# Patient Record
Sex: MALE | Race: WHITE
[De-identification: names, ages, dates, MRNs, and addresses within clinical notes are randomized per-mention and may not be internally consistent; named-entity substitution may affect disease eponyms.]

---

## 2019-07-05 ENCOUNTER — HOSPITAL ENCOUNTER (OUTPATIENT)
Dept: HOSPITAL 47 - EC | Age: 64
Setting detail: OBSERVATION
LOS: 1 days | Discharge: NURSING, REHAB OR LONG-TERM CARE | End: 2019-07-06
Attending: HOSPITALIST | Admitting: HOSPITALIST
Payer: MEDICARE

## 2019-07-05 VITALS — BODY MASS INDEX: 34.5 KG/M2

## 2019-07-05 DIAGNOSIS — G89.4: ICD-10-CM

## 2019-07-05 DIAGNOSIS — M47.9: ICD-10-CM

## 2019-07-05 DIAGNOSIS — M06.9: ICD-10-CM

## 2019-07-05 DIAGNOSIS — E78.5: ICD-10-CM

## 2019-07-05 DIAGNOSIS — T40.601A: Primary | ICD-10-CM

## 2019-07-05 DIAGNOSIS — Z79.4: ICD-10-CM

## 2019-07-05 DIAGNOSIS — Z97.8: ICD-10-CM

## 2019-07-05 DIAGNOSIS — M96.1: ICD-10-CM

## 2019-07-05 DIAGNOSIS — G40.909: ICD-10-CM

## 2019-07-05 DIAGNOSIS — Z79.1: ICD-10-CM

## 2019-07-05 DIAGNOSIS — I95.2: ICD-10-CM

## 2019-07-05 DIAGNOSIS — Z79.82: ICD-10-CM

## 2019-07-05 DIAGNOSIS — I50.9: ICD-10-CM

## 2019-07-05 DIAGNOSIS — Z79.899: ICD-10-CM

## 2019-07-05 DIAGNOSIS — E66.01: ICD-10-CM

## 2019-07-05 DIAGNOSIS — Z82.0: ICD-10-CM

## 2019-07-05 DIAGNOSIS — E11.42: ICD-10-CM

## 2019-07-05 DIAGNOSIS — I11.0: ICD-10-CM

## 2019-07-05 DIAGNOSIS — Z79.891: ICD-10-CM

## 2019-07-05 DIAGNOSIS — R26.2: ICD-10-CM

## 2019-07-05 DIAGNOSIS — Z87.891: ICD-10-CM

## 2019-07-05 DIAGNOSIS — Z86.19: ICD-10-CM

## 2019-07-05 LAB — GLUCOSE BLD-MCNC: 201 MG/DL (ref 75–99)

## 2019-07-05 PROCEDURE — 96372 THER/PROPH/DIAG INJ SC/IM: CPT

## 2019-07-05 PROCEDURE — 80048 BASIC METABOLIC PNL TOTAL CA: CPT

## 2019-07-05 PROCEDURE — 99285 EMERGENCY DEPT VISIT HI MDM: CPT

## 2019-07-05 PROCEDURE — 84132 ASSAY OF SERUM POTASSIUM: CPT

## 2019-07-05 RX ADMIN — ACETAMINOPHEN PRN MG: 500 TABLET ORAL at 23:11

## 2019-07-05 RX ADMIN — NYSTATIN SCH APPLIC: 100000 OINTMENT TOPICAL at 21:56

## 2019-07-05 RX ADMIN — INSULIN ASPART SCH UNIT: 100 INJECTION, SOLUTION INTRAVENOUS; SUBCUTANEOUS at 21:32

## 2019-07-05 NOTE — ED
Recheck HPI





- General


Stated Complaint: OD


Time Seen by Provider: 07/05/19 19:02


Source: RN notes reviewed, old records reviewed





- History of Present Illness


Initial Comments: 





This is a 63-year-old male the ER for evaluation.  This patient presents today 

for evaluation regards to pain.  Patient has generalized body pain.  Patient 

presents as a transfer patient today.  Patient was transferred to us from 

[facility] for evaluation regarding low blood pressure secondary to 

opiates.  They put him on Narcan drip and sent to ER secondary low blood 

pressures


MD Complaint: other (Evaluation of the tenderness in blood pressure secondary to

opiates)


-: unknown


Returns Today for: other (low BP)


Symptoms Since Prior Visit: no new symptoms


Associated Symptoms: none





- Related Data


                                Home Medications











 Medication  Instructions  Recorded  Confirmed


 


Acetaminophen [Tylenol] 1,000 mg PO BID 02/23/16 02/25/16


 


Gabapentin [Neurontin] 1,800 mg PO BID 02/23/16 02/25/16


 


Insulin Detemir [Levemir Flextouch] 25 units SQ HS 02/23/16 02/25/16


 


Insulin Lispro [humaLOG] 0 units SQ TID-W/MEALS PRN 02/23/16 02/25/16


 


Metoprolol Tartrate [Lopressor] 25 mg PO BID 02/23/16 02/25/16


 


Ondansetron [Zofran] 4 mg PO Q12HR PRN 02/23/16 02/25/16


 


Ranitidine HCl [Zantac] 150 mg PO BID PRN 02/23/16 02/25/16


 


Stool Softner 1 tab PO DAILY 02/23/16 02/25/16


 


oxyCODONE HCL/ACETAMINOPHEN 1 tab PO Q6HR 02/23/16 02/25/16





[Percocet 7.5-325 mg]   


 


Atorvastatin Calcium [Lipitor] 80 mg PO HS 02/25/16 02/25/16


 


Isosorbide Mononitrate ER [Imdur] 30 mg PO DAILY 02/25/16 02/25/16











                                    Allergies











Allergy/AdvReac Type Severity Reaction Status Date / Time


 


No Known Allergies Allergy   Verified 02/23/16 10:31














Review of Systems


ROS Statement: 


Those systems with pertinent positive or pertinent negative responses have been 

documented in the HPI.





ROS Other: All systems not noted in ROS Statement are negative.





Past Medical History


Past Medical History: Chest Pain / Angina, Heart Failure, Diabetes Mellitus, 

Hyperlipidemia, Hypertension, Rheumatoid Arthritis (RA)


Additional Past Medical History / Comment(s): SOB, hx intestinal blockage,


History of Any Multi-Drug Resistant Organisms: None Reported


Past Surgical History: Appendectomy, Joint Replacement, Orthopedic Surgery, Ton

sillectomy


Additional Past Surgical History / Comment(s): spinal pain stimulater, pain pump

 implated/later removed, rt knee partial replacement, left shoulder surgery, 

bone spurs left shoulder,


Past Anesthesia/Blood Transfusion Reactions: No Reported Reaction


Smoking Status: Former smoker





- Past Family History


  ** Mother


Family Medical History: No Reported History





General Exam


General appearance: alert, in no apparent distress


Head exam: Present: atraumatic, normocephalic, normal inspection


Eye exam: Present: normal appearance, PERRL, EOMI.  Absent: scleral icterus, 

conjunctival injection, periorbital swelling


ENT exam: Present: normal exam, mucous membranes moist


Neck exam: Present: normal inspection.  Absent: tenderness, meningismus, 

lymphadenopathy


Respiratory exam: Present: normal lung sounds bilaterally.  Absent: respiratory 

distress, wheezes, rales, rhonchi, stridor


Cardiovascular Exam: Present: regular rate, normal rhythm, normal heart sounds. 

 Absent: systolic murmur, diastolic murmur, rubs, gallop, clicks


GI/Abdominal exam: Present: soft, normal bowel sounds.  Absent: distended, 

tenderness, guarding, rebound, rigid


Extremities exam: Present: normal inspection, full ROM, normal capillary refill.

  Absent: tenderness, pedal edema, joint swelling, calf tenderness


Back exam: Present: normal inspection


Neurological exam: Present: alert, oriented X3, CN II-XII intact


Psychiatric exam: Present: normal affect, normal mood


Skin exam: Present: warm, dry, intact, normal color.  Absent: rash





Course





- Reevaluation(s)


Reevaluation #1: 





07/05/19 19:24


Medical and transferring paperwork are reviewed


Reevaluation #2: 





07/05/19 19:24


Patient complaining of generalized pain





Medical Decision Making





- Medical Decision Making





63 male with unintentional overdose secondary to pain pump, dosage to high.  

Patient be admitted for monitoring of pain pump dosage and evaluation by 

anesthesiology





Disposition


Clinical Impression: 


 Overdose





Narrative: 





Pain Pump Overdose


Disposition: ADMITTED AS IP TO THIS [facility]


Condition: Fair


Is patient prescribed a controlled substance at d/c from ED?: No


Referrals: 


None,Stated [Primary Care Provider] - 1-2 days

## 2019-07-05 NOTE — P.HPIM
History of Present Illness


H&P Date: 07/05/19


Chief Complaint: hypotension





63-year-old male with history of chronic pain syndrome status post intrathecal 

morphine pump





Patient was transferred to our facility from [facility].  Patient is 

currently a nursing home resident for rehab post her long hospitalization se

condary to sepsis in June 2019.  Patient has chronic pain syndrome and has 

intrathecal pain pump which has been adjusted recently.  Patient sees Dr. Ashford as an outpatient.


Patient is a very poor historian history is obtained by reviewing medical 

records and speaking with family





SS like since pain pump was adjusted and refilled recently patient has been 

having low blood pressure readings today he was sent to the hospital due to 

systolic blood pressure in the 60s patient responded to a dose of Narcan(unknown

amount at this time) which helped with his breathing and blood pressure.  His 

neurologist was contacted and recommended that he goes to our facility for 

evaluation.  Patient was evaluated by anesthesia in the ED who found that the 

pump is set up at 1.2 mg of morphine intrathecally per day this has been 

adjusted to the lowest setting of 0.15 mg per day patient was admitted for 

further monitoring overnight





Currently patient denies any pain, he reports some discomfort in his lower legs 

but tolerable.  Denies any dizziness lightheadedness chest pain trouble 

breathing fevers or chills.





Review of Systems





 





Pertinent positives as noted in HPI. All other systems were reviewed and are 

negative 





Past Medical History


Past Medical History: Chest Pain / Angina, Heart Failure, Diabetes Mellitus, 

Hyperlipidemia, Hypertension, Rheumatoid Arthritis (RA)


Additional Past Medical History / Comment(s): SOB, hx intestinal blockage,


History of Any Multi-Drug Resistant Organisms: None Reported


Past Surgical History: Appendectomy, Joint Replacement, Orthopedic Surgery, 

Tonsillectomy


Additional Past Surgical History / Comment(s): spinal pain stimulater, pain pump

implated/later removed, rt knee partial replacement, left shoulder surgery, bone

spurs left shoulder,


Past Anesthesia/Blood Transfusion Reactions: No Reported Reaction


Smoking Status: Former smoker





- Past Family History


  ** Mother


Family Medical History: No Reported History


Additional Family Medical History / Comment(s): heart problems





  ** Father


Additional Family Medical History / Comment(s): parkinsons





Medications and Allergies


                                Home Medications











 Medication  Instructions  Recorded  Confirmed  Type


 


Acetaminophen [Tylenol] 1,000 mg PO Q6H PRN 02/23/16 07/05/19 History


 


Gabapentin [Neurontin] 600 mg PO BID@0700,1900 02/23/16 07/05/19 History


 


Metoprolol Tartrate [Lopressor] 25 mg PO BID@0700,1700 02/23/16 07/05/19 History


 


Ondansetron [Zofran] 4 mg PO Q12HR PRN 02/23/16 07/05/19 History


 


Isosorbide Mononitrate ER [Imdur] 30 mg PO DAILY@0700 02/25/16 07/05/19 History


 


Aspirin EC [Ecotrin Low Dose] 81 mg PO DAILY@0700 07/05/19 07/05/19 History


 


Celecoxib [CeleBREX] 100 mg PO DAILY@0700 07/05/19 07/05/19 History


 


Insulin Lispro [humaLOG Kwikpen] See Protocol SQ ACHS@07,12,17,21 07/05/19 07/05/19 History


 


LORazepam [Ativan] 1 mg PO TID PRN 07/05/19 07/05/19 History


 


Lisinopril [Zestril] 5 mg PO DAILY@0700 07/05/19 07/05/19 History


 


Magnesium Hydroxide [Milk of 2,400 mg PO DAILY PRN 07/05/19 07/05/19 History





Magnesia]    


 


Mirtazapine [Remeron] 15 mg PO HS 07/05/19 07/05/19 History


 


Na Phos,M-B/Na Phos,Di-Ba [Fleet 1 dose RECTAL Q72H PRN 07/05/19 07/05/19 

History





Adult]    


 


Nystatin 100,000 Unit/gm Oint 1 applic TOPICAL BID 07/05/19 07/05/19 History





[Mycostatin Oint]    


 


Omeprazole 20 mg PO DAILY@0500 07/05/19 07/05/19 History


 


Polyethylene Glycol 3350 17 gm PO DAILY@0700 07/05/19 07/05/19 History


 


Potassium Chloride ER [K-Dur 20] 20 meq PO BID@0700,2100 07/05/19 07/05/19 

History


 


cloNIDine [Catapres-TTS] 1 patch TRANSDERM BORGES 07/05/19 07/05/19 History


 


levETIRAcetam [Keppra] 250 mg PO BID@0700,1700 07/05/19 07/05/19 History








                                    Allergies











Allergy/AdvReac Type Severity Reaction Status Date / Time


 


No Known Allergies Allergy   Verified 07/05/19 19:38














Physical Exam


Vitals: 


                                   Vital Signs











  Temp Pulse Pulse Resp BP BP Pulse Ox


 


 07/05/19 21:12  98.1 F   91  17   99/65  95


 


 07/05/19 21:01  98.6 F   89  16   99/65  94 L


 


 07/05/19 20:51   90   18  133/75   99


 


 07/05/19 20:00  98.1 F  95   20  113/62   99


 


 07/05/19 19:10  98.5 F  88   20  142/80   96








                                Intake and Output











 07/05/19 07/05/19 07/05/19





 06:59 14:59 22:59


 


Output Total   1600


 


Balance   -1600


 


Output:   


 


  Urine   1600


 


Other:   


 


  Weight   115.666 kg














Constitutional:          No acute distress, conversant, pleasant


Eyes:      Anicteric sclerae, moist conjunctiva, no lid-lag


         Pupils equal round reactive to light





ENMT:      NC/AT


         Oropharynx clear, no erythema, exudates





Neck:      Supple, FROM, no masses, or JVD


         No carotid bruits


         No thyromegaly





Lungs:      Clear to auscultation


         Clear to percussion


         Normal respiratory effort, no accessory muscle use 





Cardiovascular:      Heart regular in rate and rhythm, 


         No murmurs, gallops, or rubs


         No peripheral edema





Abdominal:       Soft


         Nontender, no guarding, rebound or rigidity


         Abdomen moving with respiration


         Normoactive bowel sounds


         No hepatomegaly, No splenomegaly


         No palpable mass 


         No abdominal wall hernia noted 





Skin:      Normal temperature, tone, texture, turgor


         No induration


         No subcutaneous nodules 


         No rash, lesions


         No ulcers





Extremities:      No digital cyanosis 


         No clubbing


         Pedal pulses intact and symmetrical


         Radial pulses intact and symmetrical 


         No calf tenderness 





Psychiatric:      Alert and oriented to person, place and time


         Appropriate affect


         fair judgment   


      


Neuro      Muscles Strength 4/5 in all 4 extremities 


         Sensation to light touch grossly present throughout


         Cranial nerves II-XII grossly intact


         No focal sensory deficits


Lymphatics:       no palpable cervical or supraclavicular , or inguinal lymph 

nodes  





Results


Labs: 


                  Abnormal Lab Results - Last 24 Hours (Table)











  07/05/19 Range/Units





  21:04 


 


POC Glucose (mg/dL)  201 H  (75-99)  mg/dL














Thrombosis Risk Factor Assmnt





- Choose All That Apply


Any of the Below Risk Factors Present?: No


Other Risk Factors: No


Each Risk Factor Represents 2 Points: Age 61-74 years


Thrombosis Risk Factor Assessment Total Risk Factor Score: 2


Thrombosis Risk Factor Assessment Level: Very Low Risk





Assessment and Plan


Assessment: 





63-year-old male with chronic pain syndrome patient has intrathecal morphine 

pump.  Admitted under observation with anticipated length of stay less than 48 

hours for intrathecal opiate overdose resulting in hypotension symptomatic 

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 

patient admitted for monitoring overnight


Plan: 





Intrathecal opiate overdose resulting in hypotension


Chronic pain syndrome status post intrathecal pain pump


Pain pump was adjusted by anesthesia, currently at low setting 0.15 mg per day 

of morphine


outpaitnet follow up with neurology 


monitoring overnight 


Fall precautions





Chronic conditions


History of epilepsy continue Keppra


History of hypertension continue with home meds


Diabetes mellitus insulin sliding scale


Difficulty ambulating patient is a nursing home resident at this time for rehab





DVT prophylaxis heparin subcu 3 times a day





 


Surrogate decision-maker: Patient wife


CODE STATUS full code


Discussed with: Patient, ER, RN


Anticipated discharge: Less than 48 hours


Anticipated discharge place: Back to nursing home


A total of 60 minutes was spent on the care of this complex patient more than 

50% of the time was spent in counseling and care coordination.

## 2019-07-05 NOTE — P.PAINCN
History of Present Illness





- Reason for Consult


Consult date: 07/05/19





- Chief Complaint


Intrathecal pump potential overdose.





- History of Present Illness





63-year-old male who was a patient of Dr. Ashford.  He follows up with him with 

regards to intrathecal pump that was implanted some years ago.  Over the past 4 

weeks patient has been in outpatient rehabilitation Center because of lower 

extremity weakness and difficulty with mobility.  His intrathecal pump contains 

morphine only, and about 1-2 weeks ago it was dry.  Someone from Dr. Ashford's 

office came and filled with saline, than last week he was at the office to have 

it filled on Thursday or Friday.  Over the past week has been feeling dizzy and 

lightheaded.  Per his wife is having lower than normal blood pressure.  He's 

taken to the emergency department and given multiple doses of Narcan.  His blood

pressure improved after the dosing of Narcan, its unsure exactly how much was 

given as it was done at a different facility.  I was contacted by the emergency 

room physician while this patient was still at another facility seeing if I 

would be able to interrogate and manage his pump.    He denies any altered 

mental status, lethargy, diaphoresis, withdrawal symptoms, respiratory 

depression, constipation, or diarrhea.





Pump interrogation was done which showed morphine 25 mg per mL, patient was 

receiving 1.2 mg/day.  This equates to roughly 360 mg oral of morphine.  Maps 

was reviewed, did not show any previous prescriptions in the amount being 

delivered intrathecally.





Past Medical History


Past Medical History: Chest Pain / Angina, Heart Failure, Diabetes Mellitus, 

Hyperlipidemia, Hypertension, Rheumatoid Arthritis (RA)


Additional Past Medical History / Comment(s): SOB, hx intestinal blockage,


History of Any Multi-Drug Resistant Organisms: None Reported


Past Surgical History: Appendectomy, Joint Replacement, Orthopedic Surgery, 

Tonsillectomy


Additional Past Surgical History / Comment(s): spinal pain stimulater, pain pump

implated/later removed, rt knee partial replacement, left shoulder surgery, bone

spurs left shoulder,


Past Anesthesia/Blood Transfusion Reactions: No Reported Reaction


Smoking Status: Former smoker





- Past Family History


  ** Mother


Family Medical History: No Reported History





Medications and Allergies


                                Home Medications











 Medication  Instructions  Recorded  Confirmed  Type


 


Acetaminophen [Tylenol] 1,000 mg PO Q6H PRN 02/23/16 07/05/19 History


 


Gabapentin [Neurontin] 600 mg PO BID@0700,1900 02/23/16 07/05/19 History


 


Metoprolol Tartrate [Lopressor] 25 mg PO BID@0700,1700 02/23/16 07/05/19 History


 


Ondansetron [Zofran] 4 mg PO Q12HR PRN 02/23/16 07/05/19 History


 


Isosorbide Mononitrate ER [Imdur] 30 mg PO DAILY@0700 02/25/16 07/05/19 History


 


Aspirin EC [Ecotrin Low Dose] 81 mg PO DAILY@0700 07/05/19 07/05/19 History


 


Celecoxib [CeleBREX] 100 mg PO DAILY@0700 07/05/19 07/05/19 History


 


Insulin Lispro [humaLOG Kwikpen] See Protocol SQ ACHS@07,12,17,21 07/05/19 07/05 /19 History


 


LORazepam [Ativan] 1 mg PO TID PRN 07/05/19 07/05/19 History


 


Lisinopril [Zestril] 5 mg PO DAILY@0700 07/05/19 07/05/19 History


 


Magnesium Hydroxide [Milk of 2,400 mg PO DAILY PRN 07/05/19 07/05/19 History





Magnesia]    


 


Mirtazapine [Remeron] 15 mg PO HS 07/05/19 07/05/19 History


 


Na Phos,M-B/Na Phos,Di-Ba [Fleet 1 dose RECTAL Q72H PRN 07/05/19 07/05/19 

History





Adult]    


 


Nystatin 100,000 Unit/gm Oint 1 applic TOPICAL BID 07/05/19 07/05/19 History





[Mycostatin Oint]    


 


Omeprazole 20 mg PO DAILY@0500 07/05/19 07/05/19 History


 


Polyethylene Glycol 3350 17 gm PO DAILY@0700 07/05/19 07/05/19 History


 


Potassium Chloride ER [K-Dur 20] 20 meq PO BID@0700,2100 07/05/19 07/05/19 

History


 


cloNIDine [Catapres-TTS] 1 patch TRANSDERM BORGES 07/05/19 07/05/19 History


 


levETIRAcetam [Keppra] 250 mg PO BID@0700,1700 07/05/19 07/05/19 History








                                    Allergies











Allergy/AdvReac Type Severity Reaction Status Date / Time


 


No Known Allergies Allergy   Verified 07/05/19 19:38














Physical Exam


Vitals: 


                                   Vital Signs











  Temp Pulse Resp BP Pulse Ox


 


 07/05/19 20:51   90  18  133/75  99


 


 07/05/19 20:00  98.1 F  95  20  113/62  99


 


 07/05/19 19:10  98.5 F  88  20  142/80  96








                                Intake and Output











 07/05/19 07/05/19 07/05/19





 06:59 14:59 22:59


 


Output Total   1600


 


Balance   -1600


 


Output:   


 


  Urine   1600


 


Other:   


 


  Weight   115.666 kg














- Constitutional


General appearance: morbidly obese





- EENT


Eyes: PERRLA





- Respiratory


Respiratory: bilateral: CTA, negative: wheezing





- Cardiovascular


Rhythm: regular


Heart sounds: normal: S1, S2





- Integumentary


Integumentary: normal, normal turgor





- Psychiatric


Psychiatric: A&O x's 3, appropriate affect, intact judgment & insight





Assessment and Plan


Assessment: 





Assessment:


1.  Possible opioid overdose via intrathecal route.


2.  Peripheral neuropathy


3.  Lumbar spondylosis


4.  Lumbar postlaminectomy syndrome





Plan:


1.  After interrogating intrathecal pump patient was getting 360 mg of morphine 

a day (1.2mg/day).  Per his history, he has never been prescribed dosages of 

this amount.


2.  Decreased pump to minimal settings of 0.15 mg a day of morphine.  Discussed 

with the ER physician to admit for observation overnight to stop the Narcan 

infusion that he was getting.  Also he will need to be prescribed some oral 

opiates as an outpatient to avoid significant withdrawal symptoms, also 

clonidine patches.


3.  I advised patient to follow up with Dr. Ashford to adjust his dose.





PQRS Measure Charge Sheet


PQRS Narrative: 


                                        





Smoking Status                   Former smoker


Blood Pressure                   133/75


Pain Intensity                   7


Scale Used                       Numeric (1 - 10)








Home Medications: 


Ambulatory Orders





Acetaminophen [Tylenol] 1,000 mg PO Q6H PRN 02/23/16 


Gabapentin [Neurontin] 600 mg PO BID@0700,1900 02/23/16 


Metoprolol Tartrate [Lopressor] 25 mg PO BID@0700,1700 02/23/16 


Ondansetron [Zofran] 4 mg PO Q12HR PRN 02/23/16 


Isosorbide Mononitrate ER [Imdur] 30 mg PO DAILY@0700 02/25/16 


Aspirin EC [Ecotrin Low Dose] 81 mg PO DAILY@0700 07/05/19 


Celecoxib [CeleBREX] 100 mg PO DAILY@0700 07/05/19 


Insulin Lispro [humaLOG Kwikpen] See Protocol SQ ACHS@07,12,17,21 07/05/19 


LORazepam [Ativan] 1 mg PO TID PRN 07/05/19 


Lisinopril [Zestril] 5 mg PO DAILY@0700 07/05/19 


Magnesium Hydroxide [Milk of Magnesia] 2,400 mg PO DAILY PRN 07/05/19 


Mirtazapine [Remeron] 15 mg PO HS 07/05/19 


Na Phos,M-B/Na Phos,Di-Ba [Fleet Adult] 1 dose RECTAL Q72H PRN 07/05/19 


Nystatin 100,000 Unit/gm Oint [Mycostatin Oint] 1 applic TOPICAL BID 07/05/19 


Omeprazole 20 mg PO DAILY@0500 07/05/19 


Polyethylene Glycol 3350 17 gm PO DAILY@0700 07/05/19 


Potassium Chloride ER [K-Dur 20] 20 meq PO BID@0700,2100 07/05/19 


cloNIDine [Catapres-TTS] 1 patch TRANSDERM BORGES 07/05/19 


levETIRAcetam [Keppra] 250 mg PO BID@0700,1700 07/05/19

## 2019-07-06 VITALS
RESPIRATION RATE: 18 BRPM | TEMPERATURE: 98.4 F | DIASTOLIC BLOOD PRESSURE: 52 MMHG | SYSTOLIC BLOOD PRESSURE: 88 MMHG | HEART RATE: 69 BPM

## 2019-07-06 LAB
ANION GAP SERPL CALC-SCNC: 8 MMOL/L
BUN SERPL-SCNC: 33 MG/DL (ref 9–20)
CALCIUM SPEC-MCNC: 8 MG/DL (ref 8.4–10.2)
CHLORIDE SERPL-SCNC: 109 MMOL/L (ref 98–107)
CO2 SERPL-SCNC: 17 MMOL/L (ref 22–30)
GLUCOSE BLD-MCNC: 169 MG/DL (ref 75–99)
GLUCOSE BLD-MCNC: 189 MG/DL (ref 75–99)
GLUCOSE SERPL-MCNC: 156 MG/DL (ref 74–99)
POTASSIUM SERPL-SCNC: 5.6 MMOL/L (ref 3.5–5.1)
SODIUM SERPL-SCNC: 134 MMOL/L (ref 137–145)

## 2019-07-06 RX ADMIN — NYSTATIN SCH: 100000 OINTMENT TOPICAL at 07:48

## 2019-07-06 RX ADMIN — INSULIN ASPART SCH UNIT: 100 INJECTION, SOLUTION INTRAVENOUS; SUBCUTANEOUS at 12:10

## 2019-07-06 RX ADMIN — ACETAMINOPHEN PRN MG: 500 TABLET ORAL at 04:07

## 2019-07-06 RX ADMIN — ACETAMINOPHEN PRN MG: 500 TABLET ORAL at 10:26

## 2019-07-06 RX ADMIN — INSULIN ASPART SCH: 100 INJECTION, SOLUTION INTRAVENOUS; SUBCUTANEOUS at 07:47

## 2019-07-06 NOTE — P.DS
Providers


Date of admission: 


07/05/19 19:19





Expected date of discharge: 07/06/19


Attending physician: 


Safia Wang MD





Consults: 





                                        





07/05/19 19:19


Consult Physician Routine 


   Consulting Provider: Eddie Cormier


   Consult Reason/Comments: pain


   Do you want consulting provider notified?: Yes











Primary care physician: 


[facility] Course: 





63-year-old male with history of chronic pain syndrome status post intrathecal 

morphine pump.





Patient was transferred to our facility from [facility].  Patient is 

currently a nursing home resident for rehab post his long hospitalization se

condary to sepsis in June 2019.  Patient has chronic pain syndrome and has 

intrathecal pain pump which has been adjusted recently.  Patient sees Dr. Ashford as an outpatient. Pain pump was adjusted and refilled recently patient 

has been having low blood pressure readings today he was sent to the hospital 

due to systolic blood pressure in the 60s. Patient responded to a dose of Narcan

(unknown amount at this time) which helped with his breathing and blood 

pressure.  His neurologist was contacted and recommended that he goes to our 

facility for evaluation.  





Patient was evaluated in the ED by anesthesia and the intrathecal pump was 

interrogated, 1.2 mg per day.  The settings were readjusted to the lowest dose 

of 0.15 mg per day.





Patient was seen and examined prior to discharge.  No acute events overnight.  

Patient reports no dizziness, chest pain, shortness of breath or palpitations.  

States that he would like to go back to his ECF.





General: [non toxic], [no distress], [appears at stated age]


Derm: [warm], [dry]


Head: [atraumatic], [normocephalic], [symmetric]


Eyes: [EOMI], [no lid lag], [anicteric sclera]


Mouth: [no lip lesion], [mucus membranes moist]


Cardiovascular: [S1S2 reg], [no murmur], [positive DP pulse bilateral]


Lungs: [CTA bilateral], [no rhonchi, no rales] , [no accessory muscle use]


Abdominal: [soft], [ nontender to palpation], [no guarding], [no appreciable 

organomegaly]


Ext: [no gross muscle atrophy], [no edema], [no contractures]


Neuro:  [no focal neuro deficits]


Psych: [Alert], [oriented], [appropriate affect] 





Intrathecal opiate overdose resulting in hypotension


Chronic pain syndrome status post intrathecal pain pump


Chronic conditions: History of epilepsy, hypertension, diabetes mellitus





Blood pressure is improved.  Intrathecal pain pump interrogated by anesthesia in

the ED, dose readjusted to the lowest possible dose of 0.15 mg per day.  Patient

will need to follow-up with neurology Dr. Ashford in the outpatient setting for 

readjustment.  Patient would like to go back to [facility] and we will DC the patient 

today.


Patient Condition at Discharge: Stable





Plan - Discharge Summary


Discharge Rx Participant: No


New Discharge Prescriptions: 


Continue


   Acetaminophen [Tylenol] 1,000 mg PO Q6H PRN


     PRN Reason: Pain


   Gabapentin [Neurontin] 600 mg PO BID@0700,1900


   Ondansetron [Zofran] 4 mg PO Q12HR PRN


     PRN Reason: Nausea


   Metoprolol Tartrate [Lopressor] 25 mg PO BID@0700,1700


   Isosorbide Mononitrate ER [Imdur] 30 mg PO DAILY@0700


   Na Phos,M-B/Na Phos,Di-Ba [Fleet Adult] 1 dose RECTAL Q72H PRN


     PRN Reason: Constipation


   Magnesium Hydroxide [Milk of Magnesia] 2,400 mg PO DAILY PRN


     PRN Reason: Constipation


   LORazepam [Ativan] 1 mg PO TID PRN


     PRN Reason: Anxiety


   Insulin Lispro [humaLOG Kwikpen] See Protocol SQ ACHS@07,12,17,21


   Nystatin 100,000 Unit/gm Oint [Mycostatin Oint] 1 applic TOPICAL BID


   levETIRAcetam [Keppra] 250 mg PO BID@0700,1700


   Mirtazapine [Remeron] 15 mg PO HS


   Polyethylene Glycol 3350 17 gm PO DAILY@0700


   Omeprazole 20 mg PO DAILY@0500


   Lisinopril [Zestril] 5 mg PO DAILY@0700


   Celecoxib [CeleBREX] 100 mg PO DAILY@0700


   cloNIDine [Catapres-TTS] 1 patch TRANSDERM BORGES


   Aspirin EC [Ecotrin Low Dose] 81 mg PO DAILY@0700





Discontinued


   Potassium Chloride ER [K-Dur 20] 20 meq PO BID@0700,2100


Discharge Medication List





Acetaminophen [Tylenol] 1,000 mg PO Q6H PRN 02/23/16 [History]


Gabapentin [Neurontin] 600 mg PO BID@0700,1900 02/23/16 [History]


Metoprolol Tartrate [Lopressor] 25 mg PO BID@0700,1700 02/23/16 [History]


Ondansetron [Zofran] 4 mg PO Q12HR PRN 02/23/16 [History]


Isosorbide Mononitrate ER [Imdur] 30 mg PO DAILY@0700 02/25/16 [History]


Aspirin EC [Ecotrin Low Dose] 81 mg PO DAILY@0700 07/05/19 [History]


Celecoxib [CeleBREX] 100 mg PO DAILY@0700 07/05/19 [History]


Insulin Lispro [humaLOG Kwikpen] See Protocol SQ ACHS@07,12,17,21 07/05/19 

[History]


LORazepam [Ativan] 1 mg PO TID PRN 07/05/19 [History]


Lisinopril [Zestril] 5 mg PO DAILY@0700 07/05/19 [History]


Magnesium Hydroxide [Milk of Magnesia] 2,400 mg PO DAILY PRN 07/05/19 [History]


Mirtazapine [Remeron] 15 mg PO HS 07/05/19 [History]


Na Phos,M-B/Na Phos,Di-Ba [Fleet Adult] 1 dose RECTAL Q72H PRN 07/05/19 [Histor

y]


Nystatin 100,000 Unit/gm Oint [Mycostatin Oint] 1 applic TOPICAL BID 07/05/19 

[History]


Omeprazole 20 mg PO DAILY@0500 07/05/19 [History]


Polyethylene Glycol 3350 17 gm PO DAILY@0700 07/05/19 [History]


cloNIDine [Catapres-TTS] 1 patch TRANSDERM BORGES 07/05/19 [History]


levETIRAcetam [Keppra] 250 mg PO BID@0700,1700 07/05/19 [History]








Follow up Appointment(s)/Referral(s): 


None,Stated [REFERRING] - 1-2 days


Milagro Ashford MD [Medical Doctor] - 1 Week


Ambulatory/Diagnostic Orders: 


Basic Metabolic Panel [LAB.AMB] Time Frame: 1 Day, Location: None Selected


Activity/Diet/Wound Care/Special Instructions: 


Diet: Heart healthy


Follow-up PCP within 1-2 days of discharge.


Follow-up with neurology Dr. Ashford within 1 week of discharge.


Discharge Disposition: TRANSFER TO SNF/ECF

## 2021-05-14 ENCOUNTER — HOSPITAL ENCOUNTER (OUTPATIENT)
Dept: HOSPITAL 47 - LABWHC1 | Age: 66
Discharge: HOME | End: 2021-05-14
Attending: NURSE PRACTITIONER
Payer: MEDICARE

## 2021-05-14 DIAGNOSIS — R53.83: Primary | ICD-10-CM

## 2021-05-14 LAB
ALBUMIN SERPL-MCNC: 4.1 G/DL (ref 3.8–4.9)
ALBUMIN/GLOB SERPL: 2.28 G/DL (ref 1.6–3.17)
ALP SERPL-CCNC: 102 U/L (ref 41–126)
ALT SERPL-CCNC: 40 U/L (ref 10–49)
ANION GAP SERPL CALC-SCNC: 12.5 MMOL/L (ref 4–12)
AST SERPL-CCNC: 36 U/L (ref 14–35)
BUN SERPL-SCNC: 17 MG/DL (ref 9–27)
BUN/CREAT SERPL: 28.33 RATIO (ref 12–20)
CALCIUM SPEC-MCNC: 8.7 MG/DL (ref 8.7–10.3)
CHLORIDE SERPL-SCNC: 100 MMOL/L (ref 96–109)
CK SERPL-CCNC: 149 U/L (ref 35–257)
CO2 SERPL-SCNC: 26.5 MMOL/L (ref 21.6–31.8)
ERYTHROCYTE [DISTWIDTH] IN BLOOD BY AUTOMATED COUNT: 3.8 X 10*6/UL (ref 4.4–5.6)
ERYTHROCYTE [DISTWIDTH] IN BLOOD: 13.3 % (ref 11.5–14.5)
FERRITIN SERPL-MCNC: 89.4 NG/ML (ref 22–322)
GLOBULIN SER CALC-MCNC: 1.8 G/DL (ref 1.6–3.3)
GLUCOSE SERPL-MCNC: 156 MG/DL (ref 70–110)
HBA1C MFR BLD: 8.1 % (ref 4–6)
HCT VFR BLD AUTO: 41.4 % (ref 39.6–50)
HGB BLD-MCNC: 13.1 G/DL (ref 13–17)
IRON SERPL-MCNC: 105 UG/DL (ref 65–175)
MCH RBC QN AUTO: 34.5 PG (ref 27–32)
MCHC RBC AUTO-ENTMCNC: 31.6 G/DL (ref 32–37)
MCV RBC AUTO: 108.9 FL (ref 80–97)
PLATELET # BLD AUTO: 130 X 10*3/UL (ref 140–440)
POTASSIUM SERPL-SCNC: 4.6 MMOL/L (ref 3.5–5.5)
PROT SERPL-MCNC: 5.9 G/DL (ref 6.2–8.2)
SODIUM SERPL-SCNC: 139 MMOL/L (ref 135–145)
T4 FREE SERPL-MCNC: 1 NG/DL (ref 0.8–1.8)
TIBC SERPL-MCNC: 370 UG/DL (ref 228–460)
WBC # BLD AUTO: 3.97 X 10*3/UL (ref 4.5–10)

## 2021-05-14 PROCEDURE — 84439 ASSAY OF FREE THYROXINE: CPT

## 2021-05-14 PROCEDURE — 84466 ASSAY OF TRANSFERRIN: CPT

## 2021-05-14 PROCEDURE — 80053 COMPREHEN METABOLIC PANEL: CPT

## 2021-05-14 PROCEDURE — 36415 COLL VENOUS BLD VENIPUNCTURE: CPT

## 2021-05-14 PROCEDURE — 84443 ASSAY THYROID STIM HORMONE: CPT

## 2021-05-14 PROCEDURE — 82728 ASSAY OF FERRITIN: CPT

## 2021-05-14 PROCEDURE — 82550 ASSAY OF CK (CPK): CPT

## 2021-05-14 PROCEDURE — 83550 IRON BINDING TEST: CPT

## 2021-05-14 PROCEDURE — 85027 COMPLETE CBC AUTOMATED: CPT

## 2021-05-14 PROCEDURE — 84481 FREE ASSAY (FT-3): CPT

## 2021-05-14 PROCEDURE — 83540 ASSAY OF IRON: CPT

## 2021-05-14 PROCEDURE — 83036 HEMOGLOBIN GLYCOSYLATED A1C: CPT

## 2021-05-26 ENCOUNTER — HOSPITAL ENCOUNTER (OUTPATIENT)
Dept: HOSPITAL 47 - RADCTMAIN | Age: 66
Discharge: HOME | End: 2021-05-26
Attending: PSYCHIATRY & NEUROLOGY
Payer: MEDICARE

## 2021-05-26 DIAGNOSIS — M43.16: ICD-10-CM

## 2021-05-26 DIAGNOSIS — M51.36: ICD-10-CM

## 2021-05-26 DIAGNOSIS — M99.73: ICD-10-CM

## 2021-05-26 DIAGNOSIS — M48.061: Primary | ICD-10-CM

## 2021-05-26 PROCEDURE — 72131 CT LUMBAR SPINE W/O DYE: CPT

## 2021-05-26 NOTE — CT
EXAMINATION TYPE: CT lumbar spine wo con

 

DATE OF EXAM: 5/26/2021

 

COMPARISON: None

 

HISTORY: 65-year-old male M54.5, lumbar spine pain, unavailable.

 

TECHNIQUE: Contiguous axial scanning of the lumbar spine without IV contrast. Coronal and sagittal re
constructions performed.

 

CT DLP: 1928.50 mGycm

Automated exposure control for dose reduction was used.

 

 

FINDINGS: 

Cysts within the kidneys measuring up to 3.8 cm on the right. Mild to moderate macroscopic calcificat
ions infrarenal abdominal aorta and common iliac arteries.

 

There appears to be a component of congenital spinal canal stenosis with AP canal dimension of 1.1 cm
.

 

Moderate to advanced disc/endplate degenerative changes present with narrowing and desiccated discs a
t multiple levels as well as disc bulging, ligamentum flavum thickening, and facet arthropathy.

 

Generator device right posterior lower back. Its lead extends superiorly beyond the field-of-view.

 

There is also a thin lead entering the spinal canal through the L3-L4 intralaminar space and tip term
inating at the T11 level.

 

Trace grade 1 retrolisthesis at L4-L5. Remaining alignment is maintained. Vertebral body heights are 
preserved.

 

Suspect moderate to severe spinal canal stenoses at L1-L2, L2-L3, L3-L4 secondary to the superimposed
 disc bulges. Probable moderate narrowing of the spinal canal at L4-L5 and mild at L5-S1.

 

On the left, moderate to severe neural foraminal stenosis at L2-L3 and L5-S1. Moderate at L3-L4 and L
4-L5.

 

On the right, moderate to severe neural foraminal stenosis at L3-L4 and moderate at additional levels
.

 

 

 

IMPRESSION: 

 

1. 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 DEGENERATIVE GRADE 1 RETROLISTHESIS L4-L5.

2. THERE MAY BE MODERATE TO SEVERE SPINAL CANAL STENOSES AT L1-L2, L2-L3, L3-L4. POSSIBLE MODERATE AT
 L4-L5 AND MILD AT L5-S1.

3. VARIABLE BILATERAL NEURAL FORAMINAL STENOSES AS OUTLINED ABOVE.

4. A THIN STIMULATOR LEAD ENTERING THE L3-L4 INTERLAMINAR SPACE WITH TIP TERMINATING AT THE T11 LEVEL
 AND THE SPINAL CANAL.

## 2022-06-02 ENCOUNTER — HOSPITAL ENCOUNTER (OUTPATIENT)
Dept: HOSPITAL 47 - RADCTMAIN | Age: 67
Discharge: HOME | End: 2022-06-02
Attending: UROLOGY
Payer: MEDICARE

## 2022-06-02 DIAGNOSIS — N28.1: ICD-10-CM

## 2022-06-02 DIAGNOSIS — K80.20: ICD-10-CM

## 2022-06-02 DIAGNOSIS — N20.0: Primary | ICD-10-CM

## 2022-06-02 DIAGNOSIS — N32.89: ICD-10-CM

## 2022-06-02 DIAGNOSIS — K57.30: ICD-10-CM

## 2022-06-02 LAB — BUN SERPL-SCNC: 16 MG/DL (ref 9–20)

## 2022-06-02 PROCEDURE — 74178 CT ABD&PLV WO CNTR FLWD CNTR: CPT

## 2022-06-02 PROCEDURE — 82565 ASSAY OF CREATININE: CPT

## 2022-06-02 PROCEDURE — 36415 COLL VENOUS BLD VENIPUNCTURE: CPT

## 2022-06-02 PROCEDURE — 74400 UROGRAPHY IV +-KUB TOMOG: CPT

## 2022-06-02 PROCEDURE — 84520 ASSAY OF UREA NITROGEN: CPT

## 2022-06-02 NOTE — CT
EXAMINATION TYPE: CT urogram wo/w con

 

DATE OF EXAM: 6/2/2022

 

COMPARISON: 2/8/2011

 

HISTORY: 66-year-old male R31.0, Gross hematuria, abnormality of kidneys

 

TECHNIQUE: Contiguous axial scanning of the abdomen and pelvis performed without and with IV Contrast
, patient injected with 100 mL of Isovue 370. Delayed images through the kidneys and bladder were obt
ained. Coronal/sagittal reconstructions performed. Reconstructions generated on a dedicated workstati
on.

 

CT DLP: 5353 mGycm

Automated exposure control for dose reduction was used.

 

FINDINGS: 

Heart limits of normal in size. RCA coronary calcifications are noted. Strandy scarring or atelectasi
s in the lower lungs. Mild emphysematous change. No pleural effusion.

 

No focal liver lesion or biliary ductal dilatation. Portal venous system is patent.

 

Phrygian cap of the gallbladder. Gallbladder is borderline distended but without any wall thickening 
or surrounding inflammation. Small layering calculi/gravel is present

 

Some punctate calcific densities along the expected course of the bile duct, refer to axial image 33 
and 34. Possible nonobstructive choledocholithiasis.

 

Cortical defects within both kidneys are new, possible sequela of vascular or infectious insults, new
 from 2011. Enlarging right renal cortical cysts measuring up to 4.8 cm versus 2.2 cm. This largest c
yst is at the lateral upper pole and may have mild complexity with a thin internal septation.

 

8 mm nonobstructive right renal calculus.

 

No hydronephrosis. Symmetric uptake and excretion of contrast from both kidneys.

 

No gross abnormality of the bilateral ureters.

 

Moderate episodic calcifications abdominal aorta and common iliac arteries.

 

There appears to be some type of scarring along the superficial fascia of the right periumbilical ant
erior abdominal wall.

 

Small fatty umbilical hernia measuring 2.6 cm wide.

 

Generator device projecting along the anterior left lower quadrant and also right. Median posterior b
ack. Spinal stenotic later extending up into the thoracic spinal canal.

 

No dilated small bowel, free fluid, or free air. No mesenteric or retroperitoneal lymphadenopathy.

 

Moderate stool burden. Redundant sigmoid colon. Mild sigmoid diverticulosis. No pericolonic inflammat
ory change.

 

Pelvic phleboliths. Patulous left inguinal canal. Mild circumferential bladder wall thickening can be
 correlated clinically. Prostate gland is not enlarged at 3.6 cm wide. No abnormal fluid collection i
n the pelvis or pelvic lymphadenopathy.

 

Bones: Mild degenerative change of the hips. Moderate to advanced degenerative disc disease throughou
t the lumbar spine along with hypertrophic facet arthropathy. There may be underlying multilevel spin
al canal stenoses.

 

 

IMPRESSION: 

 

1. AN 8 MM NONOBSTRUCTIVE RIGHT RENAL CALCULUS.

 

2. NUMEROUS BENIGN RIGHT RENAL CYSTS HAVING INCREASED IN SIZE FROM 2011. LARGEST MEASURES 4.8 CM VERS
US 2.2 CM, PREVIOUSLY. ALSO, THIS LARGEST CYST LOCATED AT THE LATERAL UPPER POLE MAY HAVE MILD COMPLE
XITY WITH A THIN INTERNAL SEPTATION (BOSNIAK CATEGORY 2).

 

3. NO HYDRONEPHROSIS. NO SUSPICIOUS FILLING DEFECT ALONG EITHER RENAL COLLECTING SYSTEM.

 

4. A FEW CORTICAL DEFECTS WITHIN BOTH KIDNEYS ARE NEW FROM 2011 AND SUGGESTS INTERVAL VASCULAR OR INF
ECTIOUS INSULTS.

 

5. MILD CIRCUMFERENTIAL BLADDER WALL THICKENING. CORRELATE TO EXCLUDE CYSTITIS.

 

6. PUNCTATE CALCIFICATIONS PROJECTING ALONG THE EXPECTED COURSE OF THE BILE DUCT. CORRELATE FOR POSSI
BLE NONOBSTRUCTIVE CHOLEDOCHOLITHIASIS. THERE IS ADDITIONAL LAYERING CALCULI/GRAVEL IN THE GALLBLADDE
R.

 

7. MODERATE STOOL BURDEN. MILD SIGMOID DIVERTICULOSIS.

## 2023-10-20 ENCOUNTER — HOSPITAL ENCOUNTER (OUTPATIENT)
Dept: HOSPITAL 47 - RADUSWWP | Age: 68
Discharge: HOME | End: 2023-10-20
Attending: INTERNAL MEDICINE
Payer: MEDICARE

## 2023-10-20 DIAGNOSIS — J98.6: Primary | ICD-10-CM

## 2023-10-20 DIAGNOSIS — R06.09: ICD-10-CM

## 2023-10-20 DIAGNOSIS — R06.02: ICD-10-CM

## 2023-10-20 PROCEDURE — 76000 FLUOROSCOPY <1 HR PHYS/QHP: CPT

## 2023-10-20 PROCEDURE — 71250 CT THORAX DX C-: CPT

## 2023-10-20 NOTE — FL
EXAMINATION TYPE: FL sniff test without CXR

 

DATE OF EXAM: 10/20/2023

 

COMPARISON: NONE

 

HISTORY: Shortness of breath

 

TECHNIQUE: Fluoroscopic sniff test

 

FINDINGS: There is normal paradoxical motion of the bilateral hemidiaphragms. A total of 12 seconds o
f fluoroscopic time was utilized during the procedure and 1 spot images was acquired.  Total dose are
a product (DAP) in uGy*m?, mGy*cm? (or similar): None provided.

 

IMPRESSION:

1. Normal sniff test.

## 2024-11-15 ENCOUNTER — HOSPITAL ENCOUNTER (OUTPATIENT)
Dept: HOSPITAL 47 - ORWHC2ENDO | Age: 69
Discharge: HOME | End: 2024-11-15
Attending: INTERNAL MEDICINE
Payer: MEDICARE

## 2024-11-15 VITALS — TEMPERATURE: 97 F

## 2024-11-15 VITALS — BODY MASS INDEX: 38.5 KG/M2

## 2024-11-15 VITALS — HEART RATE: 80 BPM | SYSTOLIC BLOOD PRESSURE: 139 MMHG | DIASTOLIC BLOOD PRESSURE: 76 MMHG

## 2024-11-15 VITALS — RESPIRATION RATE: 20 BRPM

## 2024-11-15 DIAGNOSIS — E11.9: ICD-10-CM

## 2024-11-15 DIAGNOSIS — I50.22: ICD-10-CM

## 2024-11-15 DIAGNOSIS — Z79.899: ICD-10-CM

## 2024-11-15 DIAGNOSIS — K80.50: Primary | ICD-10-CM

## 2024-11-15 DIAGNOSIS — Z79.84: ICD-10-CM

## 2024-11-15 DIAGNOSIS — E78.5: ICD-10-CM

## 2024-11-15 DIAGNOSIS — I11.0: ICD-10-CM

## 2024-11-15 LAB
ALBUMIN SERPL-MCNC: 3.6 G/DL (ref 3.5–5)
ALP SERPL-CCNC: 82 U/L (ref 38–126)
ALT SERPL-CCNC: 28 U/L (ref 4–49)
ANION GAP SERPL CALC-SCNC: 4 MMOL/L
AST SERPL-CCNC: 33 U/L (ref 17–59)
BASOPHILS # BLD AUTO: 0 K/UL (ref 0–0.2)
BASOPHILS NFR BLD AUTO: 0 %
BUN SERPL-SCNC: 11 MG/DL (ref 9–20)
CALCIUM SPEC-MCNC: 8.5 MG/DL (ref 8.4–10.2)
CHLORIDE SERPL-SCNC: 105 MMOL/L (ref 98–107)
CO2 SERPL-SCNC: 28 MMOL/L (ref 22–30)
EOSINOPHIL # BLD AUTO: 0.1 K/UL (ref 0–0.7)
EOSINOPHIL NFR BLD AUTO: 2 %
ERYTHROCYTE [DISTWIDTH] IN BLOOD BY AUTOMATED COUNT: 3.54 M/UL (ref 4.3–5.9)
ERYTHROCYTE [DISTWIDTH] IN BLOOD: 13.5 % (ref 11.5–15.5)
GLUCOSE BLD-MCNC: 133 MG/DL (ref 70–110)
GLUCOSE BLD-MCNC: 134 MG/DL (ref 70–110)
GLUCOSE SERPL-MCNC: 130 MG/DL (ref 74–99)
HCT VFR BLD AUTO: 38 % (ref 39–53)
HGB BLD-MCNC: 12.2 GM/DL (ref 13–17.5)
INR PPP: 1.1 (ref ?–1.2)
LYMPHOCYTES # SPEC AUTO: 0.9 K/UL (ref 1–4.8)
LYMPHOCYTES NFR SPEC AUTO: 16 %
MCH RBC QN AUTO: 34.4 PG (ref 25–35)
MCHC RBC AUTO-ENTMCNC: 32 G/DL (ref 31–37)
MCV RBC AUTO: 107.5 FL (ref 80–100)
MONOCYTES # BLD AUTO: 0.3 K/UL (ref 0–1)
MONOCYTES NFR BLD AUTO: 5 %
NEUTROPHILS # BLD AUTO: 4 K/UL (ref 1.3–7.7)
NEUTROPHILS NFR BLD AUTO: 75 %
PLATELET # BLD AUTO: 144 K/UL (ref 150–450)
POTASSIUM SERPL-SCNC: 4.7 MMOL/L (ref 3.5–5.1)
PROT SERPL-MCNC: 6 G/DL (ref 6.3–8.2)
PT BLD: 11.9 SEC (ref 10–12.5)
SODIUM SERPL-SCNC: 137 MMOL/L (ref 137–145)
WBC # BLD AUTO: 5.4 K/UL (ref 3.8–10.6)

## 2024-11-15 PROCEDURE — 43264 ERCP REMOVE DUCT CALCULI: CPT

## 2024-11-15 PROCEDURE — 85025 COMPLETE CBC W/AUTO DIFF WBC: CPT

## 2024-11-15 PROCEDURE — 85610 PROTHROMBIN TIME: CPT

## 2024-11-15 PROCEDURE — 43262 ENDO CHOLANGIOPANCREATOGRAPH: CPT

## 2024-11-15 PROCEDURE — 74330 X-RAY BILE/PANC ENDOSCOPY: CPT

## 2024-11-15 PROCEDURE — 80053 COMPREHEN METABOLIC PANEL: CPT

## 2024-11-15 RX ADMIN — IOPAMIDOL ONE ML: 612 INJECTION, SOLUTION INTRAVENOUS at 12:30

## 2024-11-15 RX ADMIN — OXYCODONE HYDROCHLORIDE STA MG: 10 TABLET, FILM COATED, EXTENDED RELEASE ORAL at 13:43

## 2024-11-15 RX ADMIN — Medication ONE MG: at 10:49

## 2024-11-15 RX ADMIN — POTASSIUM CHLORIDE SCH MLS/HR: 14.9 INJECTION, SOLUTION INTRAVENOUS at 10:49

## 2024-11-15 RX ADMIN — LEVOFLOXACIN ONE MLS/HR: 5 INJECTION, SOLUTION INTRAVENOUS at 10:49

## 2024-11-15 RX ADMIN — POTASSIUM CHLORIDE ONE MLS: 14.9 INJECTION, SOLUTION INTRAVENOUS at 10:46
